# Patient Record
Sex: MALE | Race: WHITE
[De-identification: names, ages, dates, MRNs, and addresses within clinical notes are randomized per-mention and may not be internally consistent; named-entity substitution may affect disease eponyms.]

---

## 2018-11-08 ENCOUNTER — HOSPITAL ENCOUNTER (OUTPATIENT)
Dept: HOSPITAL 25 - ED | Age: 18
Setting detail: OBSERVATION
LOS: 1 days | Discharge: HOME | End: 2018-11-09
Attending: INTERNAL MEDICINE | Admitting: INTERNAL MEDICINE
Payer: COMMERCIAL

## 2018-11-08 DIAGNOSIS — R06.02: ICD-10-CM

## 2018-11-08 DIAGNOSIS — R07.89: Primary | ICD-10-CM

## 2018-11-08 DIAGNOSIS — Z87.09: ICD-10-CM

## 2018-11-08 LAB
BASOPHILS # BLD AUTO: 0 10^3/UL (ref 0–0.2)
EOSINOPHIL # BLD AUTO: 0 10^3/UL (ref 0–0.6)
HCT VFR BLD AUTO: 47 % (ref 42–52)
HGB BLD-MCNC: 16.1 G/DL (ref 14–18)
INR PPP/BLD: 1.21 (ref 0.77–1.02)
LYMPHOCYTES # BLD AUTO: 1.7 10^3/UL (ref 1–4.8)
MCH RBC QN AUTO: 32 PG (ref 27–31)
MCHC RBC AUTO-ENTMCNC: 34 G/DL (ref 31–36)
MCV RBC AUTO: 94 FL (ref 80–94)
MONOCYTES # BLD AUTO: 0.4 10^3/UL (ref 0–0.8)
NEUTROPHILS # BLD AUTO: 3.2 10^3/UL (ref 1.5–7.7)
NRBC # BLD AUTO: 0 10^3/UL
NRBC BLD QL AUTO: 0
PLATELET # BLD AUTO: 225 10^3/UL (ref 150–450)
RBC # BLD AUTO: 5 10^6/UL (ref 4–5.4)
WBC # BLD AUTO: 5.4 10^3/UL (ref 3.5–10.8)

## 2018-11-08 PROCEDURE — 81003 URINALYSIS AUTO W/O SCOPE: CPT

## 2018-11-08 PROCEDURE — 85379 FIBRIN DEGRADATION QUANT: CPT

## 2018-11-08 PROCEDURE — 82553 CREATINE MB FRACTION: CPT

## 2018-11-08 PROCEDURE — 93306 TTE W/DOPPLER COMPLETE: CPT

## 2018-11-08 PROCEDURE — 99284 EMERGENCY DEPT VISIT MOD MDM: CPT

## 2018-11-08 PROCEDURE — 80307 DRUG TEST PRSMV CHEM ANLYZR: CPT

## 2018-11-08 PROCEDURE — 85652 RBC SED RATE AUTOMATED: CPT

## 2018-11-08 PROCEDURE — 83605 ASSAY OF LACTIC ACID: CPT

## 2018-11-08 PROCEDURE — 93005 ELECTROCARDIOGRAM TRACING: CPT

## 2018-11-08 PROCEDURE — 84443 ASSAY THYROID STIM HORMONE: CPT

## 2018-11-08 PROCEDURE — 86140 C-REACTIVE PROTEIN: CPT

## 2018-11-08 PROCEDURE — 96374 THER/PROPH/DIAG INJ IV PUSH: CPT

## 2018-11-08 PROCEDURE — 36415 COLL VENOUS BLD VENIPUNCTURE: CPT

## 2018-11-08 PROCEDURE — 80053 COMPREHEN METABOLIC PANEL: CPT

## 2018-11-08 PROCEDURE — 80048 BASIC METABOLIC PNL TOTAL CA: CPT

## 2018-11-08 PROCEDURE — 71046 X-RAY EXAM CHEST 2 VIEWS: CPT

## 2018-11-08 PROCEDURE — 82550 ASSAY OF CK (CPK): CPT

## 2018-11-08 PROCEDURE — 85730 THROMBOPLASTIN TIME PARTIAL: CPT

## 2018-11-08 PROCEDURE — 96375 TX/PRO/DX INJ NEW DRUG ADDON: CPT

## 2018-11-08 PROCEDURE — 80061 LIPID PANEL: CPT

## 2018-11-08 PROCEDURE — 85610 PROTHROMBIN TIME: CPT

## 2018-11-08 PROCEDURE — 84484 ASSAY OF TROPONIN QUANT: CPT

## 2018-11-08 PROCEDURE — 85025 COMPLETE CBC W/AUTO DIFF WBC: CPT

## 2018-11-08 RX ADMIN — CYCLOBENZAPRINE HYDROCHLORIDE SCH MG: 10 TABLET, FILM COATED ORAL at 21:24

## 2018-11-08 RX ADMIN — SENNOSIDES SCH TAB: 8.6 TABLET, FILM COATED ORAL at 21:24

## 2018-11-08 RX ADMIN — DOCUSATE SODIUM SCH MG: 100 CAPSULE, LIQUID FILLED ORAL at 21:24

## 2018-11-08 RX ADMIN — SODIUM CHLORIDE SCH MLS/HR: 900 IRRIGANT IRRIGATION at 21:22

## 2018-11-08 NOTE — ED
Respiratory





- HPI Summary


HPI Summary: 





This patient is a 18 year [facility] M [facility] a female 

with a chief complaint of sharp mid sternal CP worse with inspiration that 

began yesterday. The patient rates the pain 5/10 in severity. Pt reports "I 

feel like Im not getting enough air, Im not short of breath, it just hurts." 

Patient denies n/v/d, fever, chills, cough, and exposure to sick person. Pt 

reports hx of two spontaneous pneumothoraxes and said this feels exactly the 

same. One resolved and the second time he had a tube placed with surgery. 

Patient had a syncopal episode during the blood draw and states this happens 

every time. No hx of DVT or PE.  Denies any known trauma. 








- History of Current Complaint


Chief Complaint: EDChestWallPain


Stated Complaint: PAIN WHEN BREATHING


Time Seen by Provider: 11/08/18 18:18


Hx Obtained From: Patient


Onset/Duration: Lasting Days, Still Present


Timing: Constant


Initial Severity: Moderate


Current Severity: Moderate


Pain Intensity: 5


Sputum Amount: None


Associated Signs and Symptoms: Negative - n/v/d, fever, chills, cough, and 

exposure to sick person





- Allergy/Home Medications


Allergies/Adverse Reactions: 


 Allergies











Allergy/AdvReac Type Severity Reaction Status Date / Time


 


No Known Allergies Allergy   Verified 11/08/18 18:16











Home Medications: 


 Home Medications





NK [No Home Medications Reported]  11/08/18 [History Confirmed 11/08/18]











PMH/Surg Hx/FS Hx/Imm Hx


Endocrine/Hematology History: 


   Denies: Hx Bone Marrow Disease


Respiratory History: Reports: Other Respiratory Problems/Disorders - 

pneumothorax


 History: 


   Denies: Hx Chronic Renal Failure


EENT History: 


   Denies: Hx Deafness


Psychiatric History: 


   Denies: Hx Post Traumatic Stress Disorder


Infectious Disease History: No


Infectious Disease History: 


   Denies: Traveled Outside the [place] in Last 30 Days





- Family History


Known Family History: 


   Negative: Respiratory Disease, Seizure Disorder, Blood Disorder





- Social History


Occupation: Student


Alcohol Use: None


Substance Use Type: Reports: None


Smoking Status (MU): Never Smoked Tobacco





Review of Systems


Negative: Fever, Chills


Positive: Chest Pain


Positive: Shortness Of Breath - I feel like Im getting enough air, Im not 

short of breath, it just hurts.  Negative: Cough


Negative: Vomiting, Diarrhea, Nausea


All Other Systems Reviewed And Are Negative: Yes





Physical Exam





- Summary


Physical Exam Summary: 





GENERAL:  Patient is a well-developed and nourished M who is lying comfortable 

in the stretcher.  Patient is not in any acute respiratory distress.


HEAD AND FACE: Normocephalic


EYES: PERRLA, EOMI x 2.


EARS: Hearing grossly intact.


MOUTH: Oropharynx within normal limits.


NECK: Supple, trachea is midline, no adenopathy, no JVD, no carotid bruit.


CHEST: Symmetric, no tenderness at palpation


LUNGS: Clear to auscultation bilaterally. No wheezing or crackles.


CVS: Regular rate and rhythm, S1 and S2 present. Faint murmur 


ABDOMEN: Soft, non-tender. Bowel sounds are normal. No abdominal abnormal 

pulsations.


EXTREMITIES: Full ROM in all major joints, no edema, no cyanosis or clubbing.


NEURO: Alert and oriented x 3. No acute neurological deficits. Speech is normal 

and follows commands.


SKIN: Dry and warm





Triage Information Reviewed: Yes


Vital Signs On Initial Exam: 


 Initial Vitals











Temp Pulse Resp BP Pulse Ox


 


 97.8 F   75   18   143/78   98 


 


 11/08/18 18:13  11/08/18 18:13  11/08/18 18:13  11/08/18 18:13  11/08/18 18:13











Vital Signs Reviewed: Yes





Diagnostics





- Vital Signs


 Vital Signs











  Temp Pulse Resp BP Pulse Ox


 


 11/08/18 18:13  97.8 F  75  18  143/78  98














- Laboratory


Result Diagrams: 


 11/09/18 06:14





 11/09/18 06:14


Lab Statement: Any lab studies that have been ordered have been reviewed, and 

results considered in the medical decision making process.





- Radiology


  ** CXR


Radiology Interpretation Completed By: ED Physician


Summary of Radiographic Findings: no pneumothorax. Pending official report.





- EKG


  ** 1828


Cardiac Rate: NL


EKG Rhythm: Sinus Rhythm - at 86 BPM


Summary of EKG Findings: findings suggestive of bilateral atrial enlargement, q 

waves in the lateral leads





Disposition





- Course


Assessment/Plan: This patient presents to the ED with left sided substernal CP. 

Work up is unremarkable including a negative troponin and D dimer. On exam 

there is a faint murmur present. The EKG was abnormal and the patient was be 

placed in the CDU for an echo since it is after hours and patient has no 

outpatient follow up here in [facility]. I discussed the case with Dr. Dodson who 

agrees with the admission.  .  An EKG reveals NSR 86 BPM, findings suggestive 

if bilateral atrial enlargement,. q waves in the lateral leads.  CXR reveals, 

no pneumothorax. Pending official report.





- Diagnoses


Provider Diagnoses: 


 Chest pain








- Physician Notifications


Discussed Care Of Patient With: Theresa Dodson


Time Discussed With Above Provider: 19:35


Instructed by Provider To: Admit As Inpatient





Discharge





- Sign-Out/Discharge


Documenting (check all that apply): Patient Departure - admitted 





- Discharge Plan


Condition: Fair


Disposition: ADMITTED TO [facility]





- Billing Disposition and Condition


Condition: FAIR


Disposition: Admitted to [facility]





- Attestation Statements


Document Initiated by Scribe: Yes


Documenting Scribe: Carlos Alberto Allen 


Provider For Whom Blaire is Documenting (Include Credential): Krystyna Colbert MD 


Scribe Attestation: 


Carlos Alberto NOGUERA , scribed for Krystyna Colbert MD  on 11/09/18 at 0739. 


Scribe Documentation Reviewed: Yes


Provider Attestation: 


The documentation as recorded by the Carlos Alberto ferrell  accurately reflects 

the service I personally performed and the decisions made by me, Krystyna Colbert MD

## 2018-11-08 NOTE — ADMNOTE
Subjective


Date of Service: 11/08/18


Interval History: 





code status full 





this is admission h/p 





hpi 18 yr old wm with hx of left spontaneous ptx at age 14 otherwise with no 

sig hx presented to er with chest pain last night. pt was lifting heavy stuff 

right beforehands then had this chest pain. pain described constant muscle pull 

type sharp localized on anterior chest around the upper sternum but towards the 

left side.  sleep makes it better and not moving makes it better. but later he 

took a deep breath, at the end of each breath, he will has chest pain starting. 

---> went to see student [facility]---> they do not have x ray and referred pt to 

er. 


initial ekg and trop neg but er dr heard a murmur and echo already went home 

also she was worried about myocarditis 





pmhx none





pshx 


hx of left spontaneous ptx twice at age 14 first time was small ntd then one 

week afterwards he had a second episode of ptx which he had to have chest tube 

placement no recurrence since 2014





social no cig no etoh no ivda is a [employer] 





fhx one maternal cousin is a type i dm 


Family History: Findings - as above


Social History: Findings - as above


Past Medical History: Findings - as above





Review of Systems





- Measurements


Intake and Output: 


Intake and Output Last 24 Hours











 11/06/18 11/07/18 11/08/18 11/09/18





 06:59 06:59 06:59 06:59


 


Weight    155 lb














- Review of Systems


General Comments: 





pertinent as per hpi





Objective


Active Medications: 








Cyclobenzaprine HCl (Flexeril Tab*)  10 mg PO TID [facility]


Docusate Sodium (Colace Cap*)  100 mg PO BID [facility]


Sodium Chloride (Ns 0.9% 1000 Ml*)  1,000 mls @ 125 mls/hr IV PER RATE [facility]


Ketorolac Tromethamine (Toradol Inj*)  15 mg IV PUSH Q6H PRN


   PRN Reason: PAIN


Ondansetron HCl (Zofran Inj*)  4 mg IV Q6H PRN


   PRN Reason: NAUSEA/VOMITING


Senna (Senokot Tab*)  1 tab PO BID [facility]








 Vital Signs - 8 hr











  11/08/18 11/08/18 11/08/18





  18:13 18:24 18:26


 


Temperature 97.8 F  


 


Pulse Rate 75 76 81


 


Respiratory 18 20 18





Rate   


 


Blood Pressure 143/78 151/90 





(mmHg)   


 


O2 Sat by Pulse 98 98 97





Oximetry   














  11/08/18 11/08/18 11/08/18





  18:34 18:35 18:55


 


Temperature   


 


Pulse Rate 59 62 63


 


Respiratory 14 13 19





Rate   


 


Blood Pressure 90/45 103/47 109/80





(mmHg)   


 


O2 Sat by Pulse 99 99 99





Oximetry   














  11/08/18 11/08/18 11/08/18





  19:00 19:24 19:54


 


Temperature   


 


Pulse Rate 69 67 91


 


Respiratory 17 15 22





Rate   


 


Blood Pressure  124/64 138/77





(mmHg)   


 


O2 Sat by Pulse 99 98 98





Oximetry   














  11/08/18





  20:00


 


Temperature 


 


Pulse Rate 72


 


Respiratory 19





Rate 


 


Blood Pressure 





(mmHg) 


 


O2 Sat by Pulse 98





Oximetry 











Appearance: nad


Eyes: No Scleral Icterus, PERRLA


Ears/Nose/Mouth/Throat: NL Teeth, Lips, Gums, Clear Oropharnyx, Mucous 

Membranes Moist


Neck: NL Appearance and Movements; NL JVP, Trachea Midline, No Thyroid 

Enlargement, Masses


Respiratory: Symmetrical Chest Expansion and Respiratory Effort, Clear to 

Auscultation, - - chest tenderness is reproducible when anterior chest is 

pressured 


Cardiovascular: NL Sounds; No Murmurs; No JVD, RRR, No Edema


Abdominal: NL Sounds; No Tenderness; No Distention


Extremities: No Edema, No Clubbing, Cyanosis, - - from times four exts 


Neurological: Alert and Oriented x 3, NL Sensation, NL Muscle Strength and Tone


Result Diagrams: 


 11/09/18 06:14





 11/09/18 06:14


Diagnostic Imaging: 





ekg ns early replorization ekg changes seen 





Assess/Plan/Problems-Billing


Assessment: 





this is a 18 yr old wm with hx of ptx at at age 14 no recurrence since 

presented to er with c/o of chest pain after lifting heavy stuff his chest pain 

is reproducible when anterior chest is pressured. er dr heart murmur and wants 

an echo and was worried about myocaritis and wanted pt to be observed 








- Patient Problems


(1) Atypical chest pain


Status: Acute   Code(s): R07.89 - OTHER CHEST PAIN   SNOMED Code(s): 425374131


   Comment: tele with thomas 


echo 


ck cpk/mb and trop 


toradol for pain 


trial of flexerial 


ck tsh and lipid 


   





(2) Hx of pneumothorax


Status: Acute   Code(s): Z87.09 - PERSONAL HISTORY OF OTHER DISEASES OF THE 

RESPIRATORY SYSTEM   SNOMED Code(s): 274373640


   Comment: no recurrence since 2014 moniter

## 2018-11-09 VITALS — DIASTOLIC BLOOD PRESSURE: 67 MMHG | SYSTOLIC BLOOD PRESSURE: 121 MMHG

## 2018-11-09 LAB
BASOPHILS # BLD AUTO: 0 10^3/UL (ref 0–0.2)
EOSINOPHIL # BLD AUTO: 0.1 10^3/UL (ref 0–0.6)
HCT VFR BLD AUTO: 44 % (ref 42–52)
HGB BLD-MCNC: 14.8 G/DL (ref 14–18)
LYMPHOCYTES # BLD AUTO: 2.1 10^3/UL (ref 1–4.8)
MCH RBC QN AUTO: 32 PG (ref 27–31)
MCHC RBC AUTO-ENTMCNC: 34 G/DL (ref 31–36)
MCV RBC AUTO: 94 FL (ref 80–94)
MONOCYTES # BLD AUTO: 0.3 10^3/UL (ref 0–0.8)
NEUTROPHILS # BLD AUTO: 3.4 10^3/UL (ref 1.5–7.7)
NRBC # BLD AUTO: 0 10^3/UL
NRBC BLD QL AUTO: 0.2
PLATELET # BLD AUTO: 183 10^3/UL (ref 150–450)
RBC # BLD AUTO: 4.63 10^6/UL (ref 4–5.4)
WBC # BLD AUTO: 5.9 10^3/UL (ref 3.5–10.8)

## 2018-11-09 RX ADMIN — SODIUM CHLORIDE SCH MLS/HR: 900 IRRIGANT IRRIGATION at 05:25

## 2018-11-09 RX ADMIN — DOCUSATE SODIUM SCH MG: 100 CAPSULE, LIQUID FILLED ORAL at 10:09

## 2018-11-09 RX ADMIN — CYCLOBENZAPRINE HYDROCHLORIDE SCH MG: 10 TABLET, FILM COATED ORAL at 10:09

## 2018-11-09 RX ADMIN — CYCLOBENZAPRINE HYDROCHLORIDE SCH: 10 TABLET, FILM COATED ORAL at 17:07

## 2018-11-09 RX ADMIN — SENNOSIDES SCH TAB: 8.6 TABLET, FILM COATED ORAL at 10:09

## 2018-11-09 NOTE — ECHO
Patient:      GERHARD RONDON   

Med Rec#:     [id number]            :          2000          

Date:         2018            Age:          18y                 

Account#:     [id number]          Height:       183 cm / 72.0 in

Accession#:   [id number]           Weight:       70.3 kg / 154.9 lbs

Sex:          M                     BSA:          1.9

Room#:        [facility]                

Admit Date#:  2018          

Type:         Inpatient

 

Referring:    Theresa Dodson

Reading:      Christoph Kee MD

Sonographer:  Josie Leo RN RDCS

______________________________________________________________________

 

Transthoracic Echocardiogram

 

Indication:

Cardiac murmur

BP:           100/59

HR:           54

Rhythm:       Bradycardia

 

Findings     

History:

Left spontaneous pneumothorax at age 14 

 

Technical Comments:

The study quality is fair.  

 

Left Ventricle:

The left ventricular chamber size is normal. Global left ventricular

wall motion and contractility are within normal limits. There is normal

left ventricular systolic function. The estimated ejection fraction is

50-55%.  Normal left ventricular diastolic filling is observed. 

 

Left Atrium:

The left atrial chamber size is normal. 

 

Right Ventricle:

Moderator Band present. The right ventricular cavity size is normal. The

right ventricular global systolic function is normal. 

 

Right Atrium:

The right atrial cavity size is normal. 

 

Aortic Valve:

The aortic valve is trileaflet. There is a trace of aortic

regurgitation. There is no evidence of aortic stenosis. 

 

Mitral Valve:

The mitral valve leaflets do not appear thickened. There is a trace of

mitral regurgitation. 

 

Tricuspid Valve:

The tricuspid valve leaflets are normal.  There is trace tricuspid

regurgitation.  Unable to estimate the right ventricular systolic

pressure.   

 

Pulmonic Valve:

The pulmonic valve appears normal. There is a trace pulmonic

regurgitation.  There is no pulmonic stenosis.  

 

Pericardium:

There is no significant pericardial effusion. 

 

Aorta:

There is no dilatation of the ascending aorta. There is no dilatation of

the aortic arch. There is no dilation of the aortic root. 

 

Pulmonary Artery:

The main pulmonary artery appears normal. 

 

Venous:

The inferior vena cava appears normal in size. There is a greater than

50% respiratory change in the inferior vena cava dimension. 

 

Conclusions

There is normal left ventricular systolic function.

The estimated ejection fraction is 50-55%. 

Global left ventricular wall motion and contractility are within normal

limits.

Normal cardiac chamber sizes. 

Functionally benign heart valves. 

There is no prior echocardiogram available to compare with at this time.

 

 

Measurements     

Name                    Value         Normal Range            

RVIDd (AP) 2D           2.5 cm        (0.9 - 2.6)             

RVDdMajor (2D)          4 cm          (2.2 - 4.4)             

RAd ISD 4CH             4.4 cm        (3.4 - 4.9)             

RA (A4C)W               4.1 cm        (2.9 - 4.6)             

IVSd (2D)               0.8 cm        (0.6 - 1)               

LVPWd (2D)              0.9 cm        (0.6 - 1)               

LVIDd (2D)              4.3 cm        (3.6 - 5.4)             

LVIDs (2D)              3 cm          -                        

LV FS (2D)              30 %          (25 - 45)               

Aortic Annulus          1.9 cm        (1.4 - 2.6)             

Ao root diameter (2D)   2.9 cm        (2.1 - 3.5)             

Ascending Ao            2.6 cm        (2.1 - 3.4)             

Aortic arch             2.2 cm        (1.8 - 3.4)             

LA dimension (AP) 2D    3.1 cm        (2.3 - 3.8)             

LAd ISD 4CH             4.7 cm        (2.9 - 5.3)             

LA ISD 4CH W            4.4 cm        (2.5 - 4.5)             

 

Name                    Value         Normal Range            

LA ESV BP (A/L) index   18.8 ml/m2    -                        

 

Name                    Value         Normal Range            

MV E-wave Vmax          1.1 m/sec     -                        

MV deceleration time    264 msec      -                        

MV A-wave Vmax          0.68 m/sec    -                        

MV E:A ratio            1.6 ratio     -                        

LV septal e' Vmax       0.11 m/sec    -                        

LV lateral e' Vmax      0.2 m/sec     -                        

LV E:e' septal ratio    10 ratio      -                        

LV E:e' lateral ratio   5.5 ratio     -                        

 

Name                    Value         Normal Range            

AV Vmax                 1.3 m/sec     -                        

AV VTI                  31.3 cm       -                        

AV peak gradient        7 mmHg        -                        

AV mean gradient        4 mmHg        -                        

LVOT diameter           2 cm          -                        

LVOT Vmax               1.1 m/sec     -                        

LVOT VTI                24.5 cm       -                        

LVOT peak gradient      5 mmHg        -                        

LVOT mean gradient      3 mmHg        -                        

EFREN (continuity Vmax)   2.7 cm2       -                        

EFREN (continuity VTI)    2.5 cm2       -                        

ADALI Vmax                1.3 m/sec     -                        

 

Name                    Value         Normal Range            

IVC diameter            2 cm          -                        

 

Name                    Value         Normal Range            

PV Vmax                 0.95 m/sec    -                        

 

Electronically signed by: Christoph Kee MD on 2018 11:06:04

## 2018-11-10 NOTE — DS
DISCHARGE SUMMARY:

 

DATE OF ADMISSION:  11/08/18

 

DATE OF DISCHARGE:  11/09/18

 

FINAL DISCHARGE DIAGNOSIS:  Chest pain, atypical, musculoskeletal.

 

HOSPITAL COURSE:  The patient was admitted on 11/08/18 secondary to sudden onset of chest pain, start
ed night before admitted.  He was lifting heavy stuff, prior to the onset of the pain and he develope
d sudden muscle kind of picture of chest discomfort.  It was in the anterior of the chest, upper ster
num radiating to the left.  He felt bothered with standing still, not moving and it hurt when he took
 deep breath.  He did have history of spontaneous pneumothorax when he was at the age of 14 and given
 the sudden onset of the pain, he presented to the emergency room.  His initial workup was all fairly
 unremarkable.  However, there was a documented murmur on exam by the ER physician.  Hence they were 
concerned him from going home and medicine service was consulted for further evaluation.  The patient
 was admitted by the hospitalist zana at night for observation and he was seen and evaluated by
 me this morning.  His hospital course was reviewed and will be summarized as follows.  His CBC was f
airly unremarkable.  No leukocytosis.  Normal H and H.  His D-dimer was less than 200 and his 
ry revealed negative troponin x4 set with negative CPK and CK/MB.  I came in this morning, I reviewed
 his EKG, which was fairly normal other than bradycardia.  He had no ischemic changes, ST changes.  T
here was no ESR or CRP given the concern of myocarditis that was entertained by the ER physician.  I 
did send for a stat ESR and CRP to be added to the ER lab and his ESR was 2 and his CRP was less than
 1, therefore, made the likelihood of myocarditis or pericarditis very unlikely.  His chest x-ray als
o was on order, reviewed and did not show any acute disease.  His echocardiogram was also done, revea
led normal ejection fraction 55%.  No evidence of systolic or diastolic dysfunction or any significan
t valvular disease.  The patient was seen and evaluated by me again early afternoon and he was cleare
d for discharge with the diagnosis of atypical chest pain.

 

PHYSICAL EXAMINATION:  His vitals, temperature 98, pulse 66 to 68 regular, respiratory rate 18, satti
ng 100% on room air and blood pressure 123/59.  General: He is awake, alert, oriented, no apparent or
 cardiac or respiratory distress. Finish his sentence as full sentence.  Head and neck:  Normocephali
c, atraumatic. Supple.  Extraocular muscles intact.  Moist mucosa membrane.  No JVD.  No carotid brui
t appreciated.  His lungs are clear to auscultation.  Some minimal bibasilar crackles, which probably
 from his atelectasis.  Cardiovascular:  S1 and S2. Regular rate and rhythm.  I could not appreciated
 any murmur or frictional rub. Abdomen:  Positive bowel sounds.  Soft, nontender, nondistended.  Extr
emities: There is no pedal edema.  Good peripheral pulse.  Moving all extremities x4 with no deficit.
  CNS:  No motor or focal sensory deficits.  Skin:  There is no significant rash or lesion.

 

SIGNIFICANT DIAGNOSTIC STUDIES:  CBC, chemistry unremarkable.  Troponin negative x4.  ESR less than 2
.  CRP less than 1.

 

Chest x-ray, no acute disease.

 

2D echo, ejection fraction 55%, no valvular disease.  No systolic or diastolic dysfunction.

 

EKG normal sinus rhythm, bradycardiac for the rate however, no ST or T-wave changes to suggest ischem
ia.

 

DISCHARGE MEDICATIONS:  Ibuprofen 2 mg take two tab as needed every 6 hours with food for pain.

 

 [id number]/CPS #: [id number]

## 2023-12-25 ENCOUNTER — NON-APPOINTMENT (OUTPATIENT)
Age: 23
End: 2023-12-25

## 2023-12-27 ENCOUNTER — NON-APPOINTMENT (OUTPATIENT)
Age: 23
End: 2023-12-27

## 2024-01-02 ENCOUNTER — NON-APPOINTMENT (OUTPATIENT)
Age: 24
End: 2024-01-02

## 2024-01-02 ENCOUNTER — APPOINTMENT (OUTPATIENT)
Dept: INTERNAL MEDICINE | Facility: CLINIC | Age: 24
End: 2024-01-02
Payer: COMMERCIAL

## 2024-01-02 VITALS
WEIGHT: 148 LBS | OXYGEN SATURATION: 95 % | BODY MASS INDEX: 20.05 KG/M2 | HEIGHT: 72 IN | HEART RATE: 69 BPM | TEMPERATURE: 98.7 F | SYSTOLIC BLOOD PRESSURE: 117 MMHG | DIASTOLIC BLOOD PRESSURE: 77 MMHG

## 2024-01-02 DIAGNOSIS — R00.2 PALPITATIONS: ICD-10-CM

## 2024-01-02 DIAGNOSIS — Z00.00 ENCOUNTER FOR GENERAL ADULT MEDICAL EXAMINATION W/OUT ABNORMAL FINDINGS: ICD-10-CM

## 2024-01-02 DIAGNOSIS — F41.9 ANXIETY DISORDER, UNSPECIFIED: ICD-10-CM

## 2024-01-02 PROCEDURE — 99385 PREV VISIT NEW AGE 18-39: CPT | Mod: 25

## 2024-01-02 PROCEDURE — 93000 ELECTROCARDIOGRAM COMPLETE: CPT | Mod: 59

## 2024-01-02 PROCEDURE — 36415 COLL VENOUS BLD VENIPUNCTURE: CPT

## 2024-01-02 RX ORDER — ALPRAZOLAM 1 MG/1
1 TABLET ORAL
Qty: 6 | Refills: 0 | Status: ACTIVE | COMMUNITY
Start: 2024-01-02 | End: 1900-01-01

## 2024-01-02 NOTE — PHYSICAL EXAM
[No Acute Distress] : no acute distress [Well Nourished] : well nourished [Well Developed] : well developed [Well-Appearing] : well-appearing [Normal Sclera/Conjunctiva] : normal sclera/conjunctiva [Normal Outer Ear/Nose] : the outer ears and nose were normal in appearance [No JVD] : no jugular venous distention [No Respiratory Distress] : no respiratory distress  [No Accessory Muscle Use] : no accessory muscle use [Clear to Auscultation] : lungs were clear to auscultation bilaterally [Normal Rate] : normal rate  [Regular Rhythm] : with a regular rhythm [Normal S1, S2] : normal S1 and S2 [No Murmur] : no murmur heard

## 2024-01-02 NOTE — REVIEW OF SYSTEMS
[Palpitations] : palpitations [Fever] : no fever [Chills] : no chills [Fatigue] : no fatigue [Night Sweats] : no night sweats [Recent Change In Weight] : ~T no recent weight change [Earache] : no earache [Hearing Loss] : no hearing loss [Sore Throat] : no sore throat [Chest Pain] : no chest pain [Lower Ext Edema] : no lower extremity edema [Orthopena] : no orthopnea [Shortness Of Breath] : no shortness of breath [Wheezing] : no wheezing [Cough] : no cough [Dyspnea on Exertion] : not dyspnea on exertion [Abdominal Pain] : no abdominal pain [Nausea] : no nausea [Constipation] : no constipation [Diarrhea] : no diarrhea [Vomiting] : no vomiting [Heartburn] : no heartburn [Dysuria] : no dysuria [Incontinence] : no incontinence [Frequency] : no frequency [Joint Pain] : no joint pain [Headache] : no headache

## 2024-01-02 NOTE — ASSESSMENT
[FreeTextEntry1] : Refusing vaccines, pt going on vacation in 2 days, he will think about it next time

## 2024-01-02 NOTE — HISTORY OF PRESENT ILLNESS
[de-identified] : Mr. FISHER is a23 year M with PMH of spontaneous pneumothorax s/p chest tube and pleurodesis who comes to establish care. Refer intermittent palpitations after he got COVID few weeks ago, mostly at night. Also endorse general anxiety

## 2024-01-03 DIAGNOSIS — E55.9 VITAMIN D DEFICIENCY, UNSPECIFIED: ICD-10-CM

## 2024-01-03 LAB
25(OH)D3 SERPL-MCNC: 26.4 NG/ML
ALBUMIN SERPL ELPH-MCNC: 4.9 G/DL
ALP BLD-CCNC: 54 U/L
ALT SERPL-CCNC: 16 U/L
ANION GAP SERPL CALC-SCNC: 12 MMOL/L
AST SERPL-CCNC: 15 U/L
BASOPHILS # BLD AUTO: 0.04 K/UL
BASOPHILS NFR BLD AUTO: 0.7 %
BILIRUB SERPL-MCNC: 0.4 MG/DL
BUN SERPL-MCNC: 11 MG/DL
CALCIUM SERPL-MCNC: 10 MG/DL
CHLORIDE SERPL-SCNC: 104 MMOL/L
CO2 SERPL-SCNC: 25 MMOL/L
CREAT SERPL-MCNC: 1.11 MG/DL
EGFR: 95 ML/MIN/1.73M2
EOSINOPHIL # BLD AUTO: 0.05 K/UL
EOSINOPHIL NFR BLD AUTO: 0.9 %
GLUCOSE SERPL-MCNC: 91 MG/DL
HCT VFR BLD CALC: 48.2 %
HCV AB SER QL: NONREACTIVE
HCV S/CO RATIO: 0.15 S/CO
HGB BLD-MCNC: 15.5 G/DL
HIV1+2 AB SPEC QL IA.RAPID: NONREACTIVE
IMM GRANULOCYTES NFR BLD AUTO: 0.4 %
LYMPHOCYTES # BLD AUTO: 1.49 K/UL
LYMPHOCYTES NFR BLD AUTO: 26.9 %
MAN DIFF?: NORMAL
MCHC RBC-ENTMCNC: 31.3 PG
MCHC RBC-ENTMCNC: 32.2 GM/DL
MCV RBC AUTO: 97.2 FL
MONOCYTES # BLD AUTO: 0.32 K/UL
MONOCYTES NFR BLD AUTO: 5.8 %
NEUTROPHILS # BLD AUTO: 3.62 K/UL
NEUTROPHILS NFR BLD AUTO: 65.3 %
PLATELET # BLD AUTO: 208 K/UL
POTASSIUM SERPL-SCNC: 4.6 MMOL/L
PROT SERPL-MCNC: 7.2 G/DL
RBC # BLD: 4.96 M/UL
RBC # FLD: 12.2 %
SODIUM SERPL-SCNC: 142 MMOL/L
T PALLIDUM AB SER QL IA: NEGATIVE
TSH SERPL-ACNC: 1.7 UIU/ML
WBC # FLD AUTO: 5.54 K/UL

## 2024-01-03 RX ORDER — CHOLECALCIFEROL (VITAMIN D3) 25 MCG
25 MCG TABLET ORAL
Qty: 30 | Refills: 3 | Status: ACTIVE | COMMUNITY
Start: 2024-01-03 | End: 1900-01-01

## 2024-02-28 ENCOUNTER — TRANSCRIPTION ENCOUNTER (OUTPATIENT)
Age: 24
End: 2024-02-28

## 2024-04-25 RX ORDER — BUSPIRONE HYDROCHLORIDE 10 MG/1
10 TABLET ORAL
Qty: 180 | Refills: 0 | Status: ACTIVE | COMMUNITY
Start: 2024-01-02 | End: 1900-01-01

## 2024-07-23 ENCOUNTER — TRANSCRIPTION ENCOUNTER (OUTPATIENT)
Age: 24
End: 2024-07-23

## 2024-10-09 ENCOUNTER — TRANSCRIPTION ENCOUNTER (OUTPATIENT)
Age: 24
End: 2024-10-09

## 2025-03-19 ENCOUNTER — NON-APPOINTMENT (OUTPATIENT)
Age: 25
End: 2025-03-19

## 2025-03-19 ENCOUNTER — APPOINTMENT (OUTPATIENT)
Dept: INTERNAL MEDICINE | Facility: CLINIC | Age: 25
End: 2025-03-19
Payer: COMMERCIAL

## 2025-03-19 VITALS
DIASTOLIC BLOOD PRESSURE: 79 MMHG | WEIGHT: 146 LBS | SYSTOLIC BLOOD PRESSURE: 132 MMHG | HEIGHT: 72 IN | HEART RATE: 76 BPM | OXYGEN SATURATION: 98 % | BODY MASS INDEX: 19.77 KG/M2

## 2025-03-19 DIAGNOSIS — F41.9 ANXIETY DISORDER, UNSPECIFIED: ICD-10-CM

## 2025-03-19 DIAGNOSIS — Z23 ENCOUNTER FOR IMMUNIZATION: ICD-10-CM

## 2025-03-19 PROCEDURE — 90715 TDAP VACCINE 7 YRS/> IM: CPT

## 2025-03-19 PROCEDURE — 90656 IIV3 VACC NO PRSV 0.5 ML IM: CPT

## 2025-03-19 PROCEDURE — 90480 ADMN SARSCOV2 VAC 1/ONLY CMP: CPT

## 2025-03-19 PROCEDURE — 91320 SARSCV2 VAC 30MCG TRS-SUC IM: CPT

## 2025-03-19 PROCEDURE — G0008: CPT

## 2025-03-19 PROCEDURE — 90472 IMMUNIZATION ADMIN EACH ADD: CPT

## 2025-03-19 PROCEDURE — 99212 OFFICE O/P EST SF 10 MIN: CPT | Mod: 25

## 2025-03-19 RX ORDER — AZITHROMYCIN 500 MG/1
500 TABLET, FILM COATED ORAL
Qty: 2 | Refills: 0 | Status: COMPLETED | COMMUNITY
Start: 2025-03-19 | End: 2025-03-20

## 2025-04-15 ENCOUNTER — APPOINTMENT (OUTPATIENT)
Dept: INTERNAL MEDICINE | Facility: CLINIC | Age: 25
End: 2025-04-15

## 2025-09-02 DIAGNOSIS — R07.9 CHEST PAIN, UNSPECIFIED: ICD-10-CM

## 2025-09-04 ENCOUNTER — APPOINTMENT (OUTPATIENT)
Dept: RADIOLOGY | Facility: CLINIC | Age: 25
End: 2025-09-04

## 2025-09-04 ENCOUNTER — OUTPATIENT (OUTPATIENT)
Dept: OUTPATIENT SERVICES | Facility: HOSPITAL | Age: 25
LOS: 1 days | End: 2025-09-04
Payer: COMMERCIAL

## 2025-09-04 PROCEDURE — 71046 X-RAY EXAM CHEST 2 VIEWS: CPT | Mod: 26

## 2025-09-04 RX ORDER — BACLOFEN 10 MG/1
10 TABLET ORAL
Qty: 5 | Refills: 0 | Status: ACTIVE | COMMUNITY
Start: 2025-09-04 | End: 1900-01-01

## 2025-09-04 RX ORDER — ETODOLAC 500 MG/1
500 TABLET, FILM COATED ORAL DAILY
Qty: 15 | Refills: 0 | Status: ACTIVE | COMMUNITY
Start: 2025-09-04 | End: 1900-01-01

## 2025-09-18 ENCOUNTER — APPOINTMENT (OUTPATIENT)
Dept: INTERNAL MEDICINE | Facility: CLINIC | Age: 25
End: 2025-09-18
Payer: COMMERCIAL

## 2025-09-18 VITALS
DIASTOLIC BLOOD PRESSURE: 75 MMHG | OXYGEN SATURATION: 98 % | SYSTOLIC BLOOD PRESSURE: 114 MMHG | WEIGHT: 146 LBS | BODY MASS INDEX: 19.8 KG/M2 | HEART RATE: 76 BPM

## 2025-09-18 DIAGNOSIS — R07.9 CHEST PAIN, UNSPECIFIED: ICD-10-CM

## 2025-09-18 DIAGNOSIS — Z00.00 ENCOUNTER FOR GENERAL ADULT MEDICAL EXAMINATION W/OUT ABNORMAL FINDINGS: ICD-10-CM

## 2025-09-18 DIAGNOSIS — F41.9 ANXIETY DISORDER, UNSPECIFIED: ICD-10-CM

## 2025-09-18 DIAGNOSIS — E55.9 VITAMIN D DEFICIENCY, UNSPECIFIED: ICD-10-CM

## 2025-09-18 PROCEDURE — 99395 PREV VISIT EST AGE 18-39: CPT

## 2025-09-19 LAB
25(OH)D3 SERPL-MCNC: 25.1 NG/ML
ALBUMIN SERPL ELPH-MCNC: 4.8 G/DL
ALP BLD-CCNC: 52 U/L
ALT SERPL-CCNC: 28 U/L
ANION GAP SERPL CALC-SCNC: 16 MMOL/L
AST SERPL-CCNC: 19 U/L
BASOPHILS # BLD AUTO: 0.07 K/UL
BASOPHILS NFR BLD AUTO: 1.2 %
BILIRUB SERPL-MCNC: 0.5 MG/DL
BUN SERPL-MCNC: 13 MG/DL
CALCIUM SERPL-MCNC: 10.1 MG/DL
CHLORIDE SERPL-SCNC: 101 MMOL/L
CHOLEST SERPL-MCNC: 150 MG/DL
CO2 SERPL-SCNC: 23 MMOL/L
CREAT SERPL-MCNC: 1.01 MG/DL
EGFRCR SERPLBLD CKD-EPI 2021: 105 ML/MIN/1.73M2
EOSINOPHIL # BLD AUTO: 0.07 K/UL
EOSINOPHIL NFR BLD AUTO: 1.2 %
ESTIMATED AVERAGE GLUCOSE: 105 MG/DL
GLUCOSE SERPL-MCNC: 115 MG/DL
HBA1C MFR BLD HPLC: 5.3 %
HCT VFR BLD CALC: 47.9 %
HDLC SERPL-MCNC: 49 MG/DL
HGB BLD-MCNC: 16 G/DL
IMM GRANULOCYTES NFR BLD AUTO: 0.4 %
LDLC SERPL-MCNC: 82 MG/DL
LYMPHOCYTES # BLD AUTO: 2.16 K/UL
LYMPHOCYTES NFR BLD AUTO: 37.8 %
MAN DIFF?: NORMAL
MCHC RBC-ENTMCNC: 32.7 PG
MCHC RBC-ENTMCNC: 33.4 G/DL
MCV RBC AUTO: 97.8 FL
MONOCYTES # BLD AUTO: 0.36 K/UL
MONOCYTES NFR BLD AUTO: 6.3 %
NEUTROPHILS # BLD AUTO: 3.03 K/UL
NEUTROPHILS NFR BLD AUTO: 53.1 %
NONHDLC SERPL-MCNC: 101 MG/DL
PLATELET # BLD AUTO: 233 K/UL
POTASSIUM SERPL-SCNC: 4.1 MMOL/L
PROT SERPL-MCNC: 7.4 G/DL
RBC # BLD: 4.9 M/UL
RBC # FLD: 12 %
SODIUM SERPL-SCNC: 141 MMOL/L
TRIGL SERPL-MCNC: 103 MG/DL
TSH SERPL-ACNC: 2.54 UIU/ML
WBC # FLD AUTO: 5.71 K/UL